# Patient Record
Sex: FEMALE | Race: WHITE | NOT HISPANIC OR LATINO | Employment: FULL TIME | URBAN - METROPOLITAN AREA
[De-identification: names, ages, dates, MRNs, and addresses within clinical notes are randomized per-mention and may not be internally consistent; named-entity substitution may affect disease eponyms.]

---

## 2021-01-16 ENCOUNTER — OFFICE VISIT (OUTPATIENT)
Dept: URGENT CARE | Facility: CLINIC | Age: 56
End: 2021-01-16
Payer: COMMERCIAL

## 2021-01-16 ENCOUNTER — APPOINTMENT (OUTPATIENT)
Dept: RADIOLOGY | Facility: CLINIC | Age: 56
End: 2021-01-16
Payer: COMMERCIAL

## 2021-01-16 VITALS
SYSTOLIC BLOOD PRESSURE: 117 MMHG | HEIGHT: 65 IN | WEIGHT: 168 LBS | RESPIRATION RATE: 18 BRPM | DIASTOLIC BLOOD PRESSURE: 62 MMHG | HEART RATE: 72 BPM | TEMPERATURE: 97.3 F | OXYGEN SATURATION: 98 % | BODY MASS INDEX: 27.99 KG/M2

## 2021-01-16 DIAGNOSIS — S39.012A STRAIN OF MUSCLE, FASCIA AND TENDON OF LOWER BACK, INITIAL ENCOUNTER: ICD-10-CM

## 2021-01-16 DIAGNOSIS — S39.012A STRAIN OF MUSCLE, FASCIA AND TENDON OF LOWER BACK, INITIAL ENCOUNTER: Primary | ICD-10-CM

## 2021-01-16 PROCEDURE — 99203 OFFICE O/P NEW LOW 30 MIN: CPT | Performed by: PHYSICIAN ASSISTANT

## 2021-01-16 PROCEDURE — 72100 X-RAY EXAM L-S SPINE 2/3 VWS: CPT

## 2021-01-16 RX ORDER — KETOROLAC TROMETHAMINE 30 MG/ML
60 INJECTION, SOLUTION INTRAMUSCULAR; INTRAVENOUS ONCE
Status: COMPLETED | OUTPATIENT
Start: 2021-01-16 | End: 2021-01-16

## 2021-01-16 RX ORDER — PREDNISONE 10 MG/1
10 TABLET ORAL DAILY
Qty: 21 TABLET | Refills: 0 | Status: SHIPPED | OUTPATIENT
Start: 2021-01-16

## 2021-01-16 RX ORDER — ATORVASTATIN CALCIUM 20 MG/1
TABLET, FILM COATED ORAL
COMMUNITY
Start: 2020-12-15

## 2021-01-16 RX ORDER — METHOCARBAMOL 750 MG/1
750 TABLET, FILM COATED ORAL EVERY 6 HOURS PRN
Qty: 30 TABLET | Refills: 0 | Status: SHIPPED | OUTPATIENT
Start: 2021-01-16

## 2021-01-16 RX ORDER — LEVOTHYROXINE SODIUM 100 UG/1
TABLET ORAL
COMMUNITY
Start: 2021-01-15

## 2021-01-16 RX ADMIN — KETOROLAC TROMETHAMINE 60 MG: 30 INJECTION, SOLUTION INTRAMUSCULAR; INTRAVENOUS at 12:45

## 2021-01-16 NOTE — PROGRESS NOTES
[facility]        NAME: Ashley Seen is a 60 y o  female  : 1965    MRN: [id number]  DATE: 2021  TIME: 2:01 PM    Assessment and Plan   Strain of muscle, fascia and tendon of lower back, initial encounter [R08 145C]  1  Strain of muscle, fascia and tendon of lower back, initial encounter  XR spine lumbar 2 or 3 views injury    ketorolac (TORADOL) injection 60 mg    Ambulatory referral to Physical Therapy    predniSONE 10 mg tablet    methocarbamol (ROBAXIN) 750 mg tablet         Patient Instructions   Take prednisone as prescribed  Use Robaxin as needed for muscle spasm  Do not use ibuprofen while taking prednisone  May use Tylenol in addition to these medications if needed  Remain active  Gentle range of motion and stretching  Ice and heat  Physical therapy  Follow up with PCP in 3-5 days  Proceed to  ER if symptoms worsen  Chief Complaint     Chief Complaint   Patient presents with    Back Pain     pt states her low back went out on thursday when she was picking up her granddaughter off the floor  has used ice and heat with no improvment  History of Present Illness        Patient is a 57-year-old female with significant past medical history of chronic back pain/issues  Presents the office complaining of low back pain for 3 days  Pain is described as 10/10 tight and spastic which is worse with all movements especially flexion  Patient states she initially throughout her back while vacuuming but symptoms resolved  Patient reports symptoms reoccurred after she went to lift her granddaughter  She reports radiation to the lateral thigh  She denies numbness, tingling, incontinence, LE weakness, saddle anesthesia, or recent falls  Patient reports she has a history of herniated disc but is unsure of location  She has been using Aleve, ice, heat, and a stim with no relief  Symptoms are worsening        Review of Systems   Review of Systems   Musculoskeletal: Positive for back pain  Negative for arthralgias  Skin: Negative for wound  Neurological: Negative for numbness  Current Medications       Current Outpatient Medications:     atorvastatin (LIPITOR) 20 mg tablet, , Disp: , Rfl:     Euthyrox 100 MCG tablet, , Disp: , Rfl:     methocarbamol (ROBAXIN) 750 mg tablet, Take 1 tablet (750 mg total) by mouth every 6 (six) hours as needed for muscle spasms, Disp: 30 tablet, Rfl: 0    predniSONE 10 mg tablet, Take 1 tablet (10 mg total) by mouth daily Take 6 on day 1, take 5 on day 2, take 4 on day 3, take 3 on day 4, take 2 on day 5, take 1 on day 6 , Disp: 21 tablet, Rfl: 0  No current facility-administered medications for this visit  Current Allergies     Allergies as of 01/16/2021    (No Known Allergies)            The following portions of the patient's history were reviewed and updated as appropriate: allergies, current medications, past family history, past medical history, past social history, past surgical history and problem list      Past Medical History:   Diagnosis Date    Disease of thyroid gland        Past Surgical History:   Procedure Laterality Date    SHOULDER SURGERY         History reviewed  No pertinent family history  Medications have been verified  Objective   /62   Pulse 72   Temp (!) 97 3 °F (36 3 °C)   Resp 18   Ht 5' 5" (1 651 m)   Wt 76 2 kg (168 lb)   SpO2 98%   BMI 27 96 kg/m²   No LMP recorded  Physical Exam     Physical Exam  Vitals signs and nursing note reviewed  Constitutional:       Appearance: She is well-developed  HENT:      Head: Normocephalic and atraumatic        Right Ear: External ear normal       Left Ear: External ear normal       Nose: Nose normal    Eyes:      General: Lids are normal       Conjunctiva/sclera: Conjunctivae normal    Musculoskeletal:      Cervical back: Normal       Thoracic back: Normal       Comments:  Lumbar spine:  No erythema, edema, ecchymosis, or obvious deformity  Tender to palpation from L2 to L5 and b/l SI joints  Range of motion greatly inhibited due to pain  Flexion 40°  Extension -10 degrees  Lateral bend 30 degrees  Twist 50%  5/5 LE strength  Negative straight leg raise  Neurovascularly intact  Skin:     General: Skin is warm and dry  Capillary Refill: Capillary refill takes less than 2 seconds  Neurological:      Mental Status: She is alert  Lumbar spine x-ray:  No evidence of acute osseous abnormalities  Arthritic changes  Radiology interpretation pending

## 2021-01-16 NOTE — PATIENT INSTRUCTIONS
Take prednisone as prescribed  Use Robaxin as needed for muscle spasm  Do not use ibuprofen while taking prednisone  May use Tylenol in addition to these medications if needed  Remain active  Gentle range of motion and stretching  Ice and heat  Physical therapy  Follow-up with family doctor in 3-5 days  Go to ER if symptoms become severe  Back Pain   WHAT YOU NEED TO KNOW:   Back pain is common  It can be caused by many conditions, such as arthritis or the breakdown of spinal discs  Your risk for back pain is increased by injuries, lack of activity, or repeated bending and twisting  You may feel sore or stiff on one or both sides of your back  The pain may spread to your buttocks or thighs  DISCHARGE INSTRUCTIONS:   Return to the emergency department if:   · You have pain, numbness, or weakness in one or both legs  · Your pain becomes so severe that you cannot walk  · You cannot control your urine or bowel movements  · You have severe back pain with chest pain  · You have severe back pain, nausea, and vomiting  · You have severe back pain that spreads to your side or genital area  Contact your healthcare provider if:   · You have back pain that does not get better with rest and pain medicine  · You have a fever  · You have pain that worsens when you are on your back or when you rest     · You have pain that worsens when you cough or sneeze  · You lose weight without trying  · You have questions or concerns about your condition or care  Medicines:   · NSAIDs  help decrease swelling and pain  This medicine is available with or without a doctor's order  NSAIDs can cause stomach bleeding or kidney problems in certain people  If you take blood thinner medicine, always ask your healthcare provider if NSAIDs are safe for you  Always read the medicine label and follow directions  · Acetaminophen  decreases pain and fever  It is available without a doctor's order   Ask how much to take and how often to take it  Follow directions  Read the labels of all other medicines you are using to see if they also contain acetaminophen, or ask your doctor or pharmacist  Acetaminophen can cause liver damage if not taken correctly  Do not use more than 4 grams (4,000 milligrams) total of acetaminophen in one day  · Muscle relaxers  help decrease muscle spasms and back pain  · Prescription pain medicine  may be given  Ask your healthcare provider how to take this medicine safely  Some prescription pain medicines contain acetaminophen  Do not take other medicines that contain acetaminophen without talking to your healthcare provider  Too much acetaminophen may cause liver damage  Prescription pain medicine may cause constipation  Ask your healthcare provider how to prevent or treat constipation  · Take your medicine as directed  Contact your healthcare provider if you think your medicine is not helping or if you have side effects  Tell him or her if you are allergic to any medicine  Keep a list of the medicines, vitamins, and herbs you take  Include the amounts, and when and why you take them  Bring the list or the pill bottles to follow-up visits  Carry your medicine list with you in case of an emergency  How to manage your back pain:   · Apply ice  on your back for 15 to 20 minutes every hour or as directed  Use an ice pack, or put crushed ice in a plastic bag  Cover it with a towel before you apply it to your skin  Ice helps prevent tissue damage and decreases pain  · Apply heat  on your back for 20 to 30 minutes every 2 hours for as many days as directed  Heat helps decrease pain and muscle spasms  · Stay active  as much as you can without causing more pain  Bed rest could make your back pain worse  Avoid heavy lifting until your pain is gone  · Go to physical therapy as directed    A physical therapist can teach you exercises to help improve movement and strength, and to decrease pain  Follow up with your healthcare provider in 2 weeks, or as directed:  Write down your questions so you remember to ask them during your visits  © Copyright [address] Information is for End User's use only and may not be sold, redistributed or otherwise used for commercial purposes  All illustrations and images included in CareNotes® are the copyrighted property of [employer]  or [facility] Sobeida Bennett   The above information is an  only  It is not intended as medical advice for individual conditions or treatments  Talk to your doctor, nurse or pharmacist before following any medical regimen to see if it is safe and effective for you

## 2021-01-16 NOTE — LETTER
January 16, 2021     Patient: Treva Mendoza   YOB: 1965   Date of Visit: 1/16/2021       To Whom It May Concern: It is my medical opinion that Treva Mendoza should remain out of work until 1/18/2020  If you have any questions or concerns, please don't hesitate to call           Sincerely,        Jose Orozco PA-C

## 2021-01-26 ENCOUNTER — EVALUATION (OUTPATIENT)
Dept: PHYSICAL THERAPY | Facility: CLINIC | Age: 56
End: 2021-01-26
Payer: COMMERCIAL

## 2021-01-26 DIAGNOSIS — M54.50 LOW BACK PAIN WITHOUT SCIATICA, UNSPECIFIED BACK PAIN LATERALITY, UNSPECIFIED CHRONICITY: ICD-10-CM

## 2021-01-26 DIAGNOSIS — S39.012A STRAIN OF MUSCLE, FASCIA AND TENDON OF LOWER BACK, INITIAL ENCOUNTER: Primary | ICD-10-CM

## 2021-01-26 PROCEDURE — 97161 PT EVAL LOW COMPLEX 20 MIN: CPT | Performed by: PHYSICAL THERAPIST

## 2021-01-26 NOTE — LETTER
2021    Caterina Gallego MD  [facility] [address]  [facility] [address]    Patient: Kelby Jeter   YOB: 1965   Date of Visit: 2021     Encounter Diagnosis     ICD-10-CM    1  Strain of muscle, fascia and tendon of lower back, initial encounter  S39 [id number] Ambulatory referral to Physical Therapy   2  Low back pain without sciatica, unspecified back pain laterality, unspecified chronicity  M54 5        Dear Dr Carlos Titus:    Thank you for your recent referral of Kelby Jeter  Please review the attached evaluation summary from [address] recent visit  Please verify that you agree with the plan of care by signing the attached order  If you have any questions or concerns, please do not hesitate to call  I sincerely appreciate the opportunity to share in the care of one of your patients and hope to have another opportunity to work with you in the near future  Sincerely,    Carter Malhotra, PT      Referring Provider:      I certify that I have read the below Plan of Care and certify the need for these services furnished under this plan of treatment while under my care  [facility] [address]  [id number]  Via Fax: [phone]          PT Evaluation     Today's date: 2021  Patient name: Kelby Jeter  : 1965  MRN: [id number]  Referring provider: Kristina Valdez, *  Dx:   Encounter Diagnosis     ICD-10-CM    1  Strain of muscle, fascia and tendon of lower back, initial encounter  [phone] Ambulatory referral to Physical Therapy   2  Low back pain without sciatica, unspecified back pain laterality, unspecified chronicity  M54 5                   Assessment  Assessment details: Kelby Jeter is a 60 y o  female presenting to PT with pain, nerve tension, decreased lumbar range of motion, decreased strength, and decreased tolerance to activity   Pt reports that she has had issues with her back for years, but more recently had an exacerbation in symptoms when she tried to  her granddaughter  Upon examination, patient was found to have objective deficits as listed below and is displaying ss consistent with hypomobility in L/S, poor core activation, and increased muscle tightness in paraspinals R>L  Pt is experiencing subsequent functional deficits including significant difficulty lifting, bending, and squatting, standing for periods of time, and sleeping  Patient would benefit from skilled PT services to address these impairments and to maximize function in order to improve quality of life  Thank you for the referral   Impairments: abnormal muscle firing, abnormal or restricted ROM, activity intolerance, impaired physical strength, lacks appropriate home exercise program and pain with function    Symptom irritability: moderateUnderstanding of Dx/Px/POC: excellent  Goals  Short Term Goals: to be achieved by 4 weeks  1) Patient to be independent with basic HEP  2) Decrease pain to 4/10 at its worst   3) Increase lumbar spine ROM by 25% in all deficient planes  4) Increase LE strength by 1/2 MMT grade in all deficient planes  5) Patient to report decreased sleep interruption secondary to pain  6) Increase standing tolearance to >15 min  Without an increase in back pain  Long Term Goals: to be achieved by discharge  1) FOTO equal to or greater than 73 indicating a functional change  2) Patient to be independent with comprehensive HEP  3) Abolish pain for improved quality of life  4) Lumbar spine ROM WNL all planes to improve a/iadls  5) Increase LE strength to 5/5 MMT grade in all planes to improve a/iadls  6) Patient to report no sleep interruption secondary to pain  7) Increase seated tolerance > 1 hour without pain in LB      Plan  Patient would benefit from: skilled physical therapy  Planned modality interventions: hydrotherapy and cryotherapy  Planned therapy interventions: joint mobilization, manual therapy, massage, neuromuscular re-education, stretching, strengthening, therapeutic activities, therapeutic exercise, body mechanics training, breathing training, abdominal trunk stabilization, home exercise program, functional ROM exercises and flexibility  Frequency: 2x week  Treatment plan discussed with: patient        Subjective Evaluation    History of Present Illness  Mechanism of injury: Pt notes that she has been having back pain for years  She does go to the chiropractor and states that it helps temporarily  Pt notes that she was vacuuming on the  of this month  She had a lot of pain following, but just took it easy the next few days and felt better  She notes that a few days later she tried to  her granddaugher up and felt a pull in her back  She had severe pain and was not able to stand up straight  Pt works in home care, 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 and thinks that it is making her back worse  She states that her back has "given out" about ~6x in the past year  Pt notes that flexion shoots pain down both of her legs  She notes that she cannot stand for >5 minutes without the pain in that migrates into BL hips  She notes that in the morning she is very stiff, but does feel a little better as the day goes on  She notes that her sitting at work has made her gain >20 lbs  Which she knows that has not helped  Pt notes that she does have sleep disturbances due to her back pain  She is not a great sleeper in general, but states that it has been worse  She states that most of her back pain is on her R side, but shoots down more her L leg, to her knee     Quality of life: excellent    Pain  Current pain ratin  At best pain ratin  At worst pain rating: 10  Location: LB R>L  Quality: pressure, sharp and knife-like  Relieving factors: ice  Aggravating factors: standing, walking and stair climbing    Social Support  Steps to enter house: yes  Lives in: multiple-level home  Lives with: spouse    Employment status: working        Objective     Concurrent Complaints  Positive for disturbed sleep  Negative for bladder dysfunction, bowel dysfunction and saddle (S4) numbness    Postural Observations  Seated posture: fair  Standing posture: fair        Palpation   Left   Hypertonic in the lumbar paraspinals  Right   Hypertonic in the lumbar paraspinals  Muscle spasm in the lumbar paraspinals  Tenderness of the lumbar paraspinals  Tenderness     Left Hip   No tenderness in the PSIS  Right Hip   No tenderness in the PSIS  Neurological Testing     Sensation     Lumbar   Left   Intact: light touch    Right   Intact: light touch    Reflexes   Left   Patellar (L4): normal (2+)  Achilles (S1): normal (2+)    Right   Patellar (L4): normal (2+)  Achilles (S1): normal (2+)    Active Range of Motion     Lumbar   Flexion:  Restriction level: minimal  Extension:  with pain Restriction level: moderate  Left lateral flexion:  Restriction level: moderate  Right lateral flexion:  Restriction level: moderate  Left rotation:  Restriction level: moderate  Right rotation:  with pain Restriction level: moderate    Joint Play     Hypomobile: L1, L2, L3, L4 and L5     Pain: L3, L4 and L5     Strength/Myotome Testing     Left Hip   Planes of Motion   Flexion: 4  Abduction: 4  Adduction: 4    Right Hip   Planes of Motion   Flexion: 4  Abduction: 4  Adduction: 4    Left Knee   Flexion: 4  Extension: 4    Right Knee   Flexion: 4  Extension: 4    Left Ankle/Foot   Normal strength    Right Ankle/Foot   Normal strength    Tests     Lumbar     Left   Positive passive SLR  Right   Positive passive SLR  Left Hip   Positive JANNY  Right Hip   Positive JANNY  Additional Tests Details  Increased nerve tension with 90/90 stretch BL LE    Poor Hamstring, IT band, and quad   Flexibility BL    Tenderness in piriformis musculature on L       Flowsheet Rows      Most Recent Value   PT/OT G-Codes   Current Score  57   Projected Score  73        Precautions: N/A     Daily Treatment Diary:      Initial Evaluation Date: 01/26/21  Compliance 1/26                     Visit Number 1                    Re-Eval  IE                 [facility]   Foto Captured Y                           1/26                     Manual                      L/S STM                                                                  Ther-Ex                      Bike- warm up -                     LTR HEP                     SKC HEP                     Open books HEP                     Cat/cow HEP                     child's pose  HEP                     Hamstring stretch HEP                     IT band stretch HEP                     Piriformis stretch HEP                                           Neuro Re-Ed                                                                                                Ther-Act                                                               Modalities

## 2021-01-26 NOTE — PROGRESS NOTES
PT Evaluation     Today's date: 2021  Patient name: Ashley Seen  : 1965  MRN: [id number]  Referring provider: Mariana Hernandez, *  Dx:   Encounter Diagnosis     ICD-10-CM    1  Strain of muscle, fascia and tendon of lower back, initial encounter  [id number] Ambulatory referral to Physical Therapy   2  Low back pain without sciatica, unspecified back pain laterality, unspecified chronicity  M54 5                   Assessment  Assessment details: Ashley Seen is a 60 y o  female presenting to PT with pain, nerve tension, decreased lumbar range of motion, decreased strength, and decreased tolerance to activity  Pt reports that she has had issues with her back for years, but more recently had an exacerbation in symptoms when she tried to  her granddaughter  Upon examination, patient was found to have objective deficits as listed below and is displaying ss consistent with hypomobility in L/S, poor core activation, and increased muscle tightness in paraspinals R>L  Pt is experiencing subsequent functional deficits including significant difficulty lifting, bending, and squatting, standing for periods of time, and sleeping  Patient would benefit from skilled PT services to address these impairments and to maximize function in order to improve quality of life  Thank you for the referral   Impairments: abnormal muscle firing, abnormal or restricted ROM, activity intolerance, impaired physical strength, lacks appropriate home exercise program and pain with function    Symptom irritability: moderateUnderstanding of Dx/Px/POC: excellent  Goals  Short Term Goals: to be achieved by 4 weeks  1) Patient to be independent with basic HEP  2) Decrease pain to 4/10 at its worst   3) Increase lumbar spine ROM by 25% in all deficient planes  4) Increase LE strength by 1/2 MMT grade in all deficient planes  5) Patient to report decreased sleep interruption secondary to pain    6) Increase standing tolearance to >15 min  Without an increase in back pain  Long Term Goals: to be achieved by discharge  1) FOTO equal to or greater than 73 indicating a functional change  2) Patient to be independent with comprehensive HEP  3) Abolish pain for improved quality of life  4) Lumbar spine ROM WNL all planes to improve a/iadls  5) Increase LE strength to 5/5 MMT grade in all planes to improve a/iadls  6) Patient to report no sleep interruption secondary to pain  7) Increase seated tolerance > 1 hour without pain in LB  Plan  Patient would benefit from: skilled physical therapy  Planned modality interventions: hydrotherapy and cryotherapy  Planned therapy interventions: joint mobilization, manual therapy, massage, neuromuscular re-education, stretching, strengthening, therapeutic activities, therapeutic exercise, body mechanics training, breathing training, abdominal trunk stabilization, home exercise program, functional ROM exercises and flexibility  Frequency: 2x week  Treatment plan discussed with: patient        Subjective Evaluation    History of Present Illness  Mechanism of injury: Pt notes that she has been having back pain for years  She does go to the chiropractor and states that it helps temporarily  Pt notes that she was vacuuming on the 9th of this month  She had a lot of pain following, but just took it easy the next few days and felt better  She notes that a few days later she tried to  her granddaugher up and felt a pull in her back  She had severe pain and was not able to stand up straight  Pt works in home care, 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 and thinks that it is making her back worse  She states that her back has "given out" about ~6x in the past year  Pt notes that flexion shoots pain down both of her legs  She notes that she cannot stand for >5 minutes without the pain in that migrates into BL hips   She notes that in the morning she is very stiff, but does feel a little better as the day goes on  She notes that her sitting at work has made her gain >20 lbs  Which she knows that has not helped  Pt notes that she does have sleep disturbances due to her back pain  She is not a great sleeper in general, but states that it has been worse  She states that most of her back pain is on her R side, but shoots down more her L leg, to her knee  Quality of life: excellent    Pain  Current pain ratin  At best pain ratin  At worst pain rating: 10  Location: LB R>L  Quality: pressure, sharp and knife-like  Relieving factors: ice  Aggravating factors: standing, walking and stair climbing    Social Support  Steps to enter house: yes  Lives in: multiple-level home  Lives with: spouse    Employment status: working        Objective     Concurrent Complaints  Positive for disturbed sleep  Negative for bladder dysfunction, bowel dysfunction and saddle (S4) numbness    Postural Observations  Seated posture: fair  Standing posture: fair        Palpation   Left   Hypertonic in the lumbar paraspinals  Right   Hypertonic in the lumbar paraspinals  Muscle spasm in the lumbar paraspinals  Tenderness of the lumbar paraspinals  Tenderness     Left Hip   No tenderness in the PSIS  Right Hip   No tenderness in the PSIS       Neurological Testing     Sensation     Lumbar   Left   Intact: light touch    Right   Intact: light touch    Reflexes   Left   Patellar (L4): normal (2+)  Achilles (S1): normal (2+)    Right   Patellar (L4): normal (2+)  Achilles (S1): normal (2+)    Active Range of Motion     Lumbar   Flexion:  Restriction level: minimal  Extension:  with pain Restriction level: moderate  Left lateral flexion:  Restriction level: moderate  Right lateral flexion:  Restriction level: moderate  Left rotation:  Restriction level: moderate  Right rotation:  with pain Restriction level: moderate    Joint Play     Hypomobile: L1, L2, L3, L4 and L5     Pain: L3, L4 and L5     Strength/Myotome Testing     Left Hip   Planes of Motion   Flexion: 4  Abduction: 4  Adduction: 4    Right Hip   Planes of Motion   Flexion: 4  Abduction: 4  Adduction: 4    Left Knee   Flexion: 4  Extension: 4    Right Knee   Flexion: 4  Extension: 4    Left Ankle/Foot   Normal strength    Right Ankle/Foot   Normal strength    Tests     Lumbar     Left   Positive passive SLR  Right   Positive passive SLR  Left Hip   Positive JANNY  Right Hip   Positive JANNY  Additional Tests Details  Increased nerve tension with 90/90 stretch BL LE    Poor Hamstring, IT band, and quad   Flexibility BL    Tenderness in piriformis musculature on L       Flowsheet Rows      Most Recent Value   PT/OT G-Codes   Current Score  57   Projected Score  73        Precautions: N/A     Daily Treatment Diary:      Initial Evaluation Date: 01/26/21  Compliance 1/26                     Visit Number 1                    Re-Eval  IE                 [facility]   Foto Captured Y                           1/26                     Manual                      L/S STM                                                                  Ther-Ex                      Bike- warm up -                     LTR HEP                     SKC HEP                     Open books HEP                     Cat/cow HEP                     child's pose  HEP                     Hamstring stretch HEP                     IT band stretch HEP                     Piriformis stretch HEP                                           Neuro Re-Ed                                                                                                Ther-Act                                                               Modalities

## 2021-01-27 ENCOUNTER — TRANSCRIBE ORDERS (OUTPATIENT)
Dept: PHYSICAL THERAPY | Facility: CLINIC | Age: 56
End: 2021-01-27

## 2021-01-27 DIAGNOSIS — S39.012A BACK STRAIN, INITIAL ENCOUNTER: Primary | ICD-10-CM

## 2021-02-03 ENCOUNTER — OFFICE VISIT (OUTPATIENT)
Dept: PHYSICAL THERAPY | Facility: CLINIC | Age: 56
End: 2021-02-03
Payer: COMMERCIAL

## 2021-02-03 DIAGNOSIS — M54.50 LOW BACK PAIN WITHOUT SCIATICA, UNSPECIFIED BACK PAIN LATERALITY, UNSPECIFIED CHRONICITY: ICD-10-CM

## 2021-02-03 DIAGNOSIS — S39.012A STRAIN OF MUSCLE, FASCIA AND TENDON OF LOWER BACK, INITIAL ENCOUNTER: Primary | ICD-10-CM

## 2021-02-03 PROCEDURE — 97140 MANUAL THERAPY 1/> REGIONS: CPT | Performed by: PHYSICAL THERAPIST

## 2021-02-03 NOTE — PROGRESS NOTES
Daily Note     Today's date: 2/3/2021  Patient name: Sil Hunt  : 1965  MRN: [id number]  Referring provider: Natalia Dean *  Dx:   Encounter Diagnosis     ICD-10-CM    1  Strain of muscle, fascia and tendon of lower back, initial encounter  S39 012A    2  Low back pain without sciatica, unspecified back pain laterality, unspecified chronicity  M54 5                   Subjective: Pt notes that she is not having pain shooting into her knee, but continues to have shooting pain in her LB with movement  States that she has been compliant with her HEP  Objective: See treatment diary below      Assessment: Tolerated treatment well  Pt needed moderate cueing for proper form and intensity  Increased muscle restrictions noted in paraspinals BL, noted some relief in area following STM  Pt had difficulty with PPT, should continue to improve with skilled therapy  Patient exhibited good technique with therapeutic exercises and would benefit from continued PT to address current limitations  Plan: Continue per plan of care             Precautions: N/A     Daily Treatment Diary:       Initial Evaluation Date: 21  Compliance 1/26  2/3                   Visit Number 1 2                   Re-Eval  MELANY -                MC   Foto Captured Y -                          1/26  2/3                   Manual                      L/S STM   arb                                                               Ther-Ex                      Bike- warm up -  L1 9'                   LTR HEP  10x10"                   SKC HEP  10x 5" ea                   Open books HEP  5x10"                   Cat/cow HEP  -                   child's pose  HEP  -                   Hamstring stretch HEP  3x30"                   IT band stretch HEP                     Piriformis stretch HEP                     clamshells  Green 2x10           Bridge    15x                    Neuro Re-Ed                      PPT with TA contraction  20x 5"                                                                       Ther-Act                                                               Modalities                       prn  10' post

## 2021-02-10 ENCOUNTER — OFFICE VISIT (OUTPATIENT)
Dept: PHYSICAL THERAPY | Facility: CLINIC | Age: 56
End: 2021-02-10
Payer: COMMERCIAL

## 2021-02-10 DIAGNOSIS — S39.012A STRAIN OF MUSCLE, FASCIA AND TENDON OF LOWER BACK, INITIAL ENCOUNTER: Primary | ICD-10-CM

## 2021-02-10 DIAGNOSIS — M54.50 LOW BACK PAIN WITHOUT SCIATICA, UNSPECIFIED BACK PAIN LATERALITY, UNSPECIFIED CHRONICITY: ICD-10-CM

## 2021-02-10 PROCEDURE — 97110 THERAPEUTIC EXERCISES: CPT | Performed by: PHYSICAL THERAPIST

## 2021-02-17 ENCOUNTER — OFFICE VISIT (OUTPATIENT)
Dept: PHYSICAL THERAPY | Facility: CLINIC | Age: 56
End: 2021-02-17
Payer: COMMERCIAL

## 2021-02-17 DIAGNOSIS — S39.012A STRAIN OF MUSCLE, FASCIA AND TENDON OF LOWER BACK, INITIAL ENCOUNTER: Primary | ICD-10-CM

## 2021-02-17 DIAGNOSIS — M54.50 LOW BACK PAIN WITHOUT SCIATICA, UNSPECIFIED BACK PAIN LATERALITY, UNSPECIFIED CHRONICITY: ICD-10-CM

## 2021-02-17 PROCEDURE — 97110 THERAPEUTIC EXERCISES: CPT

## 2021-02-17 PROCEDURE — 97112 NEUROMUSCULAR REEDUCATION: CPT

## 2021-02-17 NOTE — PROGRESS NOTES
Daily Note     Today's date: 2021   Patient name: Jacobo Wang  : 1965  MRN: [id number]  Referring provider: Tuan Haider *  Dx:   Encounter Diagnosis     ICD-10-CM    1  Strain of muscle, fascia and tendon of lower back, initial encounter  S39 012A    2  Low back pain without sciatica, unspecified back pain laterality, unspecified chronicity  M54 5        Start Time: 830  Stop Time: 930  Total time in clinic (min): 60 minutes    Subjective: Patient presents to PT department with no pain for the day  Mentions of feeling better as of late, however, when lower begins to flair up she feels very tight muscles  Especially when she performs any twisting motions to the left, feeling a spasm on her lower R lumbar region  Patient mentions of using ice to alleieve pain and relaxing  She explains The spasms will come from doing any active activity, like making coffee or gardening  Objective: See treatment diary below      Assessment: Tolerated treatment well  Patient needed minimal instruction on performing exercises and minimal cueing on maintaining proper form  Stretching exercises is effective with increasing range of motion and relieveing low back pain  Continual muscle strengthening in the lower back should decrease pain with improved posture  Patient would benefit from continued PT      Plan: Continue per plan of care             Precautions: N/A     Daily Treatment Diary:       Initial Evaluation Date: 21  Compliance 1/26  2/3 2/10  2/17               Visit Number 1 2  3  4               Re-Eval  IE -  -              MC   Foto Captured Y -  -                        1/26  2/3  2/10  2/17               Manual                      L/S STM   arb  arb 10'                                                              Ther-Ex                      Bike- warm up -  L1 9'  L1 9'  L10'               LTR HEP  10x10"  10x10"  10x10"               SKC HEP  10x 5" ea  10x 5" ea  10x5" ea Open books HEP  5x10" 5x 10" ea  5x10" ea               Cat/cow HEP  -  -  -               child's pose  HEP  -  -  -               Hamstring stretch HEP  3x30"  3x30"  3x30"               IT band stretch HEP    -  -               Piriformis stretch HEP    3x30"  3x30"               clamshells  Green 2x10 Green 2x10  Green 2x10         Butterfly stretch   10x10" 10x10"         Hip ext     At bar 15x ea At bar 15x ea         squats   At bar 15x  At bar 15x          Bridge    15x  -  -               Neuro Re-Ed                      PPT with TA contraction  20x 5"  20x 5"  20x5"               TA with march   10x  10x                                                Ther-Act                                                               Modalities                      MH prn  10' post  10' post def

## 2021-02-24 ENCOUNTER — APPOINTMENT (OUTPATIENT)
Dept: PHYSICAL THERAPY | Facility: CLINIC | Age: 56
End: 2021-02-24
Payer: COMMERCIAL

## 2021-03-22 NOTE — PROGRESS NOTES
DC Summary: Pt went back to her doctor and decided to try other avenues to help with her LB pain  At this time, pt is a self DC from therapy  Will contact clinic with any future questions       Rodney Palmer, PT

## 2023-03-19 ENCOUNTER — OFFICE VISIT (OUTPATIENT)
Dept: URGENT CARE | Facility: CLINIC | Age: 58
End: 2023-03-19

## 2023-03-19 VITALS
TEMPERATURE: 97.9 F | BODY MASS INDEX: 27.66 KG/M2 | HEIGHT: 65 IN | DIASTOLIC BLOOD PRESSURE: 65 MMHG | WEIGHT: 166 LBS | RESPIRATION RATE: 16 BRPM | OXYGEN SATURATION: 99 % | SYSTOLIC BLOOD PRESSURE: 136 MMHG | HEART RATE: 85 BPM

## 2023-03-19 DIAGNOSIS — J02.9 ACUTE VIRAL PHARYNGITIS: Primary | ICD-10-CM

## 2023-03-19 LAB
S PYO AG THROAT QL: NEGATIVE
SARS-COV-2 AG UPPER RESP QL IA: NEGATIVE
VALID CONTROL: NORMAL

## 2023-03-19 RX ORDER — ATORVASTATIN CALCIUM 40 MG/1
TABLET, FILM COATED ORAL
COMMUNITY
Start: 2023-02-06

## 2023-03-19 RX ORDER — LIDOCAINE HYDROCHLORIDE 20 MG/ML
15 SOLUTION OROPHARYNGEAL 4 TIMES DAILY PRN
Qty: 100 ML | Refills: 0 | Status: SHIPPED | OUTPATIENT
Start: 2023-03-19

## 2023-03-19 NOTE — PROGRESS NOTES
[facility]        NAME: Adina Garcia is a 59 y o  female  : 1965    MRN: [id number]  DATE: 2023  TIME: 3:11 PM    Assessment and Orders   Acute viral pharyngitis [J02 9]  1  Acute viral pharyngitis  POCT rapid strepA    Lidocaine Viscous HCl (XYLOCAINE) 2 % mucosal solution    Throat culture    Poct Covid 19 Rapid Antigen Test            Plan and Discussion      Symptoms and exam consistent with viral pharyngitis  Rapid COVID and strep negative  Will treat with viscous lidocaine  Discussed ED precautions including (but not limited to)  • Difficultly breathing or shortness of breath  • Chest pain  • Acutely worsening symptoms  Risks and benefits discussed  Patient understands and agrees with the plan  Follow up with PCP  Chief Complaint     Chief Complaint   Patient presents with   • Sore Throat     Since Friday sore throat worse last night and headache today          History of Present Illness       Sore Throat   This is a new problem  The current episode started in the past 7 days  There has been no fever  Associated symptoms include headaches  Pertinent negatives include no coughing  Review of Systems   Review of Systems   Constitutional: Positive for chills and fatigue  Negative for fever  HENT: Positive for sinus pressure and sore throat  Respiratory: Negative for cough  Neurological: Positive for headaches           Current Medications       Current Outpatient Medications:   •  Lidocaine Viscous HCl (XYLOCAINE) 2 % mucosal solution, Swish and spit 15 mL 4 (four) times a day as needed for mouth pain or discomfort Gargle and spit 15mL 4x a day as needed for sore throat, Disp: 100 mL, Rfl: 0  •  atorvastatin (LIPITOR) 20 mg tablet, , Disp: , Rfl:   •  atorvastatin (LIPITOR) 40 mg tablet, , Disp: , Rfl:   •  Euthyrox 100 MCG tablet, , Disp: , Rfl:     Current Allergies     Allergies as of 2023 - Reviewed 2023   Allergen Reaction Noted   • Hydrocodone Itching 12/01/2021   • Oxycodone Itching 12/01/2021            The following portions of the patient's history were reviewed and updated as appropriate: allergies, current medications, past family history, past medical history, past social history, past surgical history and problem list      Past Medical History:   Diagnosis Date   • Disease of thyroid gland        Past Surgical History:   Procedure Laterality Date   • SHOULDER SURGERY         No family history on file  Medications have been verified  Objective   /65   Pulse 85   Temp 97 9 °F (36 6 °C)   Resp 16   Ht 5' 5" (1 651 m)   Wt 75 3 kg (166 lb)   SpO2 99%   BMI 27 62 kg/m²   No LMP recorded  Patient is postmenopausal        Physical Exam     Physical Exam  HENT:      Mouth/Throat:      Pharynx: Posterior oropharyngeal erythema present  No oropharyngeal exudate  Tonsils: No tonsillar exudate  0 on the right  0 on the left  Pulmonary:      Effort: No respiratory distress  Neurological:      General: No focal deficit present  Mental Status: She is alert and oriented to person, place, and time     Psychiatric:         Mood and Affect: Mood normal          Behavior: Behavior normal                Deri Floras Viotto DO

## 2023-03-21 LAB — BACTERIA THROAT CULT: NORMAL

## 2024-08-16 ENCOUNTER — OFFICE VISIT (OUTPATIENT)
Dept: URGENT CARE | Facility: CLINIC | Age: 59
End: 2024-08-16
Payer: COMMERCIAL

## 2024-08-16 VITALS
RESPIRATION RATE: 16 BRPM | WEIGHT: 162.4 LBS | BODY MASS INDEX: 27.06 KG/M2 | SYSTOLIC BLOOD PRESSURE: 117 MMHG | HEIGHT: 65 IN | TEMPERATURE: 96.8 F | DIASTOLIC BLOOD PRESSURE: 63 MMHG | HEART RATE: 66 BPM | OXYGEN SATURATION: 98 %

## 2024-08-16 DIAGNOSIS — H65.01 NON-RECURRENT ACUTE SEROUS OTITIS MEDIA OF RIGHT EAR: Primary | ICD-10-CM

## 2024-08-16 PROCEDURE — G0382 LEV 3 HOSP TYPE B ED VISIT: HCPCS

## 2024-08-16 RX ORDER — AZITHROMYCIN 250 MG/1
TABLET, FILM COATED ORAL
Qty: 6 TABLET | Refills: 0 | Status: SHIPPED | OUTPATIENT
Start: 2024-08-16 | End: 2024-08-20

## 2024-08-16 RX ORDER — PREDNISONE 20 MG/1
40 TABLET ORAL DAILY
Qty: 10 TABLET | Refills: 0 | Status: SHIPPED | OUTPATIENT
Start: 2024-08-16 | End: 2024-08-21

## 2024-08-16 RX ORDER — OMEPRAZOLE 40 MG/1
40 CAPSULE, DELAYED RELEASE ORAL DAILY
COMMUNITY
Start: 2024-04-08 | End: 2025-04-08

## 2024-08-16 NOTE — PATIENT INSTRUCTIONS
Take full course of Azithromycin as prescribed  Eat yogurt with live and active cultures and/or take a probiotic at least 3 hours before or after antibiotic dose. Monitor stool for diarrhea and/or blood. If this occurs, contact primary care doctor ASAP.   Take prednisone as prescribed.     Fluids and rest  Tylenol/Ibuprofen for discomfort   Over the counter decongestants as needed    Follow up with PCP in 3-5 days.  Proceed to  ER if symptoms worsen.    If tests are performed, our office will contact you with results only if changes need to made to the care plan discussed with you at the visit. You can review your full results on [facility].    Patient Education     Serous Otitis Media   About this topic   The Eustachian tube allows fluid to drain from the middle ear. Sometimes, fluid cannot drain from the tube. This may cause an acute or chronic problem known as serous otitis media. An acute problem is one that does not last for a long time. Acute serous media is often caused by an infection or allergy. A chronic problem is one that lasts for a long time. Chronic serous otitis media may happen when the tube stays blocked because the fluid is too thick to drain from the tube.  This problem may go away on its own without treatment. If the fluid becomes infected, you may have ear infections more often. Your ears may feel like they are full and you may not be able to hear as well.     What are the causes?   Serous otitis media happens when something blocks the Eustachian tube. Sometimes, you are born with this problem. Other causes may include:  Infection  Allergy  Irritants like cigarette smoke  Drinking when lying down  Increase in air pressure like when flying  Enlarged adenoids  Cleft palate  What can make this more likely to happen?   Young children are more likely to have this problem. Kids get more colds. They have Eustachian tubes that are not as developed as those of an adult. Having many colds or  allergies may make you more at risk. Having a problem you were born with may cause a block.  What are the main signs?   Trouble hearing  Ear fullness  Pain or pulling on the ear  Problems with balance  How does the doctor diagnose this health problem?   Your doctor will take your history. Your doctor will do an exam and check your ears with a special tool. The doctor will look for changes to the eardrum. The doctor may order:  Lab tests  Hearing tests  How does the doctor treat this health problem?   Your doctor will treat the problem based on what the cause is. Often, it is an acute problem that the doctor will monitor over time. Drugs often do not help. Surgery may be needed to remove chronic fluid. Ear tubes may be put in to open the Eustachian tube.  Are there other health problems to treat?   If enlarged adenoids are the problem, you may need surgery to remove them.  What drugs may be needed?   Your doctor may order drugs based on what problems you are having. The doctor may order drugs to:  Help with pain and swelling  Fight an infection   Treat an allergy  What problems could happen?   Infection could come back  Loss of hearing  Problems with speech  Scarring on the eardrum  What can be done to prevent this health problem?   If you smoke, quit. Do not go near people who smoke.  Stay away from people who have colds.  If you have allergies, treat them, and try to avoid your triggers.  Wash your hands often.  If over 12 months of age, stop daytime use of a pacifier.  Last Reviewed Date   2020-08-05  Consumer Information Use and Disclaimer   This generalized information is a limited summary of diagnosis, treatment, and/or medication information. It is not meant to be comprehensive and should be used as a tool to help the user understand and/or assess potential diagnostic and treatment options. It does NOT include all information about conditions, treatments, medications, side effects, or risks that may apply to a  specific patient. It is not intended to be medical advice or a substitute for the medical advice, diagnosis, or treatment of a health care provider based on the health care provider's examination and assessment of a patient’s specific and unique circumstances. Patients must speak with a health care provider for complete information about their health, medical questions, and treatment options, including any risks or benefits regarding use of medications. This information does not endorse any treatments or medications as safe, effective, or approved for treating a specific patient. [employer] and its affiliates disclaim any warranty or liability relating to this information or the use thereof. The use of this information is governed by the Terms of Use, available at https://www.[employer].com/en/know/clinical-effectiveness-terms   Copyright   Copyright © 2024 [employer] and its affiliates and/or licensors. All rights reserved.

## 2024-08-16 NOTE — PROGRESS NOTES
[facility]        NAME: Sandy Manzo is a 58 y.o. female  : 1965    MRN: [id number]  DATE: 2024  TIME: 9:01 AM    Assessment and Plan   Non-recurrent acute serous otitis media of right ear [H65.01]  1. Non-recurrent acute serous otitis media of right ear  azithromycin (ZITHROMAX) 250 mg tablet    predniSONE 20 mg tablet            Patient Instructions     Take full course of Azithromycin as prescribed  Eat yogurt with live and active cultures and/or take a probiotic at least 3 hours before or after antibiotic dose. Monitor stool for diarrhea and/or blood. If this occurs, contact primary care doctor ASAP.   Take prednisone as prescribed.     Fluids and rest 58-year-old female  Tylenol/Ibuprofen for discomfort   Over the counter decongestants as needed    Follow up with PCP in 3-5 days.  Proceed to  ER if symptoms worsen.    If tests are performed, our office will contact you with results only if changes need to made to the care plan discussed with you at the visit. [facility]    Chief Complaint     Chief Complaint   Patient presents with    Earache     Right ear pain starting Tuesday. Pt reports throbbing pain that shoots down into throat.          History of Present Illness       58-year-old female arrives reporting right ear pain increasing over the past week.  Patient reports some sinus congestion and has been taking over-the-counter allergy medication for this with moderate relief.  Patient denies any fevers.  Patient has not been swimming and denies any sick contacts.  Patient reports pain in right ear is sharp and stabbing.  Patient reports generally feeling rundown from pain.    Earache   Pertinent negatives include no abdominal pain, coughing or sore throat.       Review of Systems   Review of Systems   Constitutional:  Positive for fatigue. Negative for chills, diaphoresis and fever.   HENT:  Positive for ear pain, sinus pressure  "and sinus pain. Negative for congestion, postnasal drip and sore throat.    Respiratory:  Negative for cough and shortness of breath.    Gastrointestinal:  Negative for abdominal pain.         Current Medications       Current Outpatient Medications:     atorvastatin (LIPITOR) 40 mg tablet, , Disp: , Rfl:     azithromycin (ZITHROMAX) 250 mg tablet, Take 2 tablets today then 1 tablet daily x 4 days, Disp: 6 tablet, Rfl: 0    Euthyrox 100 MCG tablet, , Disp: , Rfl:     omeprazole (PriLOSEC) 40 MG capsule, Take 40 mg by mouth daily, Disp: , Rfl:     predniSONE 20 mg tablet, Take 2 tablets (40 mg total) by mouth daily for 5 days, Disp: 10 tablet, Rfl: 0    atorvastatin (LIPITOR) 20 mg tablet, , Disp: , Rfl:     Lidocaine Viscous HCl (XYLOCAINE) 2 % mucosal solution, Swish and spit 15 mL 4 (four) times a day as needed for mouth pain or discomfort Gargle and spit 15mL 4x a day as needed for sore throat (Patient not taking: Reported on 8/16/2024), Disp: 100 mL, Rfl: 0    Current Allergies     Allergies as of 08/16/2024 - Reviewed 08/16/2024   Allergen Reaction Noted    Amoxicillin-pot clavulanate Diarrhea 08/30/2023    Hydrocodone Itching 12/01/2021    Oxycodone Itching 12/01/2021            The following portions of the patient's history were reviewed and updated as appropriate: allergies, current medications, past family history, past medical history, past social history, past surgical history and problem list.     Past Medical History:   Diagnosis Date    Disease of thyroid gland     GERD (gastroesophageal reflux disease)     High cholesterol        Past Surgical History:   Procedure Laterality Date    BACK SURGERY  09/2021    ELBOW SURGERY  07/08/2024    SHOULDER SURGERY  2015       Family History   Problem Relation Age of Onset    Dementia Mother     Cancer Father          Medications have been verified.        Objective   /63   Pulse 66   Temp (!) 96.8 °F (36 °C)   Resp 16   Ht 5' 5\" (1.651 m)   Wt 73.7 kg " (162 lb 6.4 oz)   SpO2 98%   BMI 27.02 kg/m²        Physical Exam     Physical Exam  Vitals and nursing note reviewed.   Constitutional:       General: She is not in acute distress.     Appearance: Normal appearance. She is not ill-appearing.   HENT:      Head: Normocephalic.      Right Ear: External ear normal. A middle ear effusion is present. Tympanic membrane is injected.      Left Ear: External ear normal.      Nose:      Right Sinus: Frontal sinus tenderness present.      Left Sinus: Frontal sinus tenderness present.      Mouth/Throat:      Mouth: Mucous membranes are moist.      Pharynx: No posterior oropharyngeal erythema.   Eyes:      Extraocular Movements: Extraocular movements intact.      Conjunctiva/sclera: Conjunctivae normal.      Pupils: Pupils are equal, round, and reactive to light.   Cardiovascular:      Rate and Rhythm: Normal rate and regular rhythm.      Pulses: Normal pulses.      Heart sounds: Normal heart sounds.   Pulmonary:      Effort: Pulmonary effort is normal. No respiratory distress.      Breath sounds: Normal breath sounds. No stridor. No wheezing, rhonchi or rales.   Chest:      Chest wall: No tenderness.   Musculoskeletal:         General: Normal range of motion.      Cervical back: Normal range of motion and neck supple.   Lymphadenopathy:      Cervical: No cervical adenopathy.   Skin:     General: Skin is warm and dry.      Capillary Refill: Capillary refill takes less than 2 seconds.   Neurological:      General: No focal deficit present.      Mental Status: She is alert and oriented to person, place, and time.   Psychiatric:         Mood and Affect: Mood normal.         Behavior: Behavior normal.                    14-Oct-2020 23:46